# Patient Record
Sex: MALE | Race: WHITE | NOT HISPANIC OR LATINO | Employment: FULL TIME | URBAN - METROPOLITAN AREA
[De-identification: names, ages, dates, MRNs, and addresses within clinical notes are randomized per-mention and may not be internally consistent; named-entity substitution may affect disease eponyms.]

---

## 2022-05-25 ENCOUNTER — PATIENT ROUNDING (BHMG ONLY) (OUTPATIENT)
Dept: INTERNAL MEDICINE | Facility: CLINIC | Age: 42
End: 2022-05-25

## 2022-05-25 ENCOUNTER — OFFICE VISIT (OUTPATIENT)
Dept: INTERNAL MEDICINE | Facility: CLINIC | Age: 42
End: 2022-05-25

## 2022-05-25 VITALS
OXYGEN SATURATION: 95 % | DIASTOLIC BLOOD PRESSURE: 72 MMHG | BODY MASS INDEX: 36.79 KG/M2 | HEIGHT: 70 IN | SYSTOLIC BLOOD PRESSURE: 128 MMHG | WEIGHT: 257 LBS | TEMPERATURE: 98.2 F | HEART RATE: 78 BPM

## 2022-05-25 DIAGNOSIS — R79.89 LOW TESTOSTERONE IN MALE: ICD-10-CM

## 2022-05-25 DIAGNOSIS — M79.661 PAIN OF RIGHT LOWER LEG: Primary | ICD-10-CM

## 2022-05-25 DIAGNOSIS — E66.09 CLASS 2 OBESITY DUE TO EXCESS CALORIES WITH BODY MASS INDEX (BMI) OF 36.0 TO 36.9 IN ADULT, UNSPECIFIED WHETHER SERIOUS COMORBIDITY PRESENT: ICD-10-CM

## 2022-05-25 DIAGNOSIS — Z76.89 ENCOUNTER TO ESTABLISH CARE: ICD-10-CM

## 2022-05-25 PROBLEM — G47.33 OBSTRUCTIVE SLEEP APNEA: Status: ACTIVE | Noted: 2017-12-26

## 2022-05-25 PROBLEM — Z86.39 HISTORY OF HYPOTHYROIDISM: Status: ACTIVE | Noted: 2017-12-26

## 2022-05-25 PROCEDURE — 99204 OFFICE O/P NEW MOD 45 MIN: CPT | Performed by: NURSE PRACTITIONER

## 2022-05-25 NOTE — ASSESSMENT & PLAN NOTE
Had a long discussion with patient about weight loss options.  Answer some questions from the patient.  Gave patient educational handout regarding calorie deficit as well as plating methods for weight loss.  Discussed about possible weight watchers routine.  Patient is going to start tracking his food via my fitness pal len[employer].  Goal before next visit is for patient to reduce his intake of sugary drinks.  We will discuss possible medications for weight loss at follow-up visit.

## 2022-05-25 NOTE — PROGRESS NOTES
Date of Encounter: 2022  Patient: Walt Gorman,  1980    Subjective     Chief complaint: Right ankle pain, establish care    HPI   Patient here today to establish care.  Patient would like to discuss about some right leg pain he has been having.  Patient states that he rolled his ankle about a year and a half ago.  Patient was not seen anywhere following the injury.  Patient states that he has a lot of stiffness in his right ankle in the morning when he wakes up.  Patient also experiences some cramping while trying to stretch his leg.  Patient feels like he is now starting to have some right hip pain due to compensating from the ankle.     Patient is also interested in discussing weight loss options.  Patient states that he has a history of low testosterone and while on testosterone replacement therapy he was able to lose some weight.  Patient states that his diet is high in fast food as well as sugary drinks due to his long work days.     Patient reports no chronic past medical history.  Patient states he does not take any medications on a daily basis.  Patient has not really followed with a PCP in the past.  [facility] and typically goes to the [facility] if he is in need of anything else.      Review of Systems:  Negative for fever, congestion, chest pain upon exertion, shortness of breath, vision changes, vomiting, dysuria, lymphadenopathy, muscle weakness, numbness, mood changes, rashes.  Positive for right lower leg discomfort.    The following portions of the patient's history were reviewed and updated as appropriate: allergies, current medications, past family history, past medical history, past social history, past surgical history and problem list.    Patient Active Problem List   Diagnosis   • Low testosterone in male   • History of hypothyroidism   • Obstructive sleep apnea   • Class 2 obesity due to excess calories with body mass index (BMI) of 36.0 to 36.9 in adult  "    History reviewed. No pertinent past medical history.  History reviewed. No pertinent surgical history.  History reviewed. No pertinent family history.  No current outpatient medications on file.  No Known Allergies          Objective   Physical Exam   Vitals:    05/25/22 0742   BP: 128/72   Pulse: 78   Temp: 98.2 °F (36.8 °C)   TempSrc: Temporal   SpO2: 95%   Weight: 117 kg (257 lb)   Height: 177.8 cm (70\")     Body mass index is 36.88 kg/m².    Constitutional: NAD.  Skill skeletal: Patient has full range of motion in right lower leg as well as ankle.  No swelling noted on exam.  No obvious deformity.  Cardiovascular: RRR  Psychiatric: Normal affect. Normal thought content.           Assessment & Plan   Assessment and Plan  Pleasant 41-year-old male with history of hypothyroidism, history of low testosterone and others here today to discuss the following:    Diagnoses and all orders for this visit:    1. Pain of right lower leg (Primary)  -     XR Foot 3+ View Right  -     XR Ankle 3+ View Right  Discussed with patient possible treatment options depending on diagnoses.  Discussed about going to physical therapy.  Answered some questions from the patient.    2. Class 2 obesity due to excess calories with body mass index (BMI) of 36.0 to 36.9 in adult, unspecified whether serious comorbidity present  Assessment & Plan:  Had a long discussion with patient about weight loss options.  Answer some questions from the patient.  Gave patient educational handout regarding calorie deficit as well as plating methods for weight loss.  Discussed about possible weight watchers routine.  Patient is going to start tracking his food via my fitness pal lenin.  Goal before next visit is for patient to reduce his intake of sugary drinks.  We will discuss possible medications for weight loss at follow-up visit.        3. Low testosterone in male  Assessment & Plan:  Patient has had previous testicle replacement.  Discussed about " updating lab work at annual physical.      4. Encounter to establish care    Welcomed patient to practice. Discussed office policies. Reviewed patient reported medical history at bedside.    Patient verbalized understanding of and agreed to plan of care.    Return in about 6 weeks (around 7/6/2022) for Annual physical, Fasting Labs.     A total of 45 minutes of time was spent on this encounter today.  This includes reviewing the patient's records, face-to-face time, documentation.    Shakila Clarke DNP, FNP-C  [facility]  O: [phone]      Please note that portions of this note were completed with a voice recognition program. Please call if questions.

## 2022-05-25 NOTE — ASSESSMENT & PLAN NOTE
Patient has had previous testicle replacement.  Discussed about updating lab work at annual physical.

## 2022-05-25 NOTE — PROGRESS NOTES
May 25, 2022    Hello, may I speak with Walt Gorman?    Sent [employer] message       Length To Time In Minutes Device Was In Place: 10

## 2022-05-25 NOTE — PATIENT INSTRUCTIONS
Prediabetes Eating Plan  Prediabetes is a condition that causes blood sugar (glucose) levels to be higher than normal. This increases the risk for developing type 2 diabetes (type 2 diabetes mellitus). Working with a health care provider or nutrition specialist (dietitian) to make diet and lifestyle changes can help prevent the onset of diabetes. These changes may help you:  Control your blood glucose levels.  Improve your cholesterol levels.  Manage your blood pressure.  What are tips for following this plan?  Reading food labels  Read food labels to check the amount of fat, salt (sodium), and sugar in prepackaged foods. Avoid foods that have:  Saturated fats.  Trans fats.  Added sugars.  Avoid foods that have more than 300 milligrams (mg) of sodium per serving. Limit your sodium intake to less than 2,300 mg each day.  Shopping  Avoid buying pre-made and processed foods.  Avoid buying drinks with added sugar.  Cooking  Cook with olive oil. Do not use butter, lard, or ghee.  Bake, broil, grill, steam, or boil foods. Avoid frying.  Meal planning    Work with your dietitian to create an eating plan that is right for you. This may include tracking how many calories you take in each day. Use a food diary, notebook, or mobile application to track what you eat at each meal.  Consider following a Mediterranean diet. This includes:  Eating several servings of fresh fruits and vegetables each day.  Eating fish at least twice a week.  Eating one serving each day of whole grains, beans, nuts, and seeds.  Using olive oil instead of other fats.  Limiting alcohol.  Limiting red meat.  Using nonfat or low-fat dairy products.  Consider following a plant-based diet. This includes dietary choices that focus on eating mostly vegetables and fruit, grains, beans, nuts, and seeds.  If you have high blood pressure, you may need to limit your sodium intake or follow a diet such as the DASH (Dietary Approaches to Stop Hypertension) eating  plan. The DASH diet aims to lower high blood pressure.    Lifestyle  Set weight loss goals with help from your health care team. It is recommended that most people with prediabetes lose 7% of their body weight.  Exercise for at least 30 minutes 5 or more days a week.  Attend a support group or seek support from a mental health counselor.  Take over-the-counter and prescription medicines only as told by your health care provider.  What foods are recommended?  Fruits  Berries. Bananas. Apples. Oranges. Grapes. Papaya. Hans. Pomegranate. Kiwi. Grapefruit. Cherries.  Vegetables  Lettuce. Spinach. Peas. Beets. Cauliflower. Cabbage. Broccoli. Carrots. Tomatoes. Squash. Eggplant. Herbs. Peppers. Onions. Cucumbers. [place] sprouts.  Grains  Whole grains, such as whole-wheat or whole-grain breads, crackers, cereals, and pasta. Unsweetened oatmeal. Bulgur. Barley. Quinoa. Brown rice. Corn or whole-wheat flour tortillas or taco shells.  Meats and other proteins  Seafood. Poultry without skin. Lean cuts of pork and beef. Tofu. Eggs. Nuts. Beans.  Dairy  Low-fat or fat-free dairy products, such as yogurt, cottage cheese, and cheese.  Beverages  Water. Tea. Coffee. Sugar-free or diet soda. [place] water. Low-fat or nonfat milk. Milk alternatives, such as soy or almond milk.  Fats and oils  Olive oil. Canola oil. Sunflower oil. Grapeseed oil. Avocado. Walnuts.  Sweets and desserts  Sugar-free or low-fat pudding. Sugar-free or low-fat ice cream and other frozen treats.  Seasonings and condiments  Herbs. Sodium-free spices. Mustard. Relish. Low-salt, low-sugar ketchup. Low-salt, low-sugar barbecue sauce. Low-fat or fat-free mayonnaise.  The items listed above may not be a complete list of recommended foods and beverages. Contact a dietitian for more information.  What foods are not recommended?  Fruits  Fruits canned with syrup.  Vegetables  Canned vegetables. Frozen vegetables with butter or cream sauce.  Grains  Refined white  flour and flour products, such as bread, pasta, snack foods, and cereals.  Meats and other proteins  Fatty cuts of meat. Poultry with skin. Breaded or fried meat. Processed meats.  Dairy  Full-fat yogurt, cheese, or milk.  Beverages  Sweetened drinks, such as iced tea and soda.  Fats and oils  Butter. Lard. Ghee.  Sweets and desserts  Baked goods, such as cake, cupcakes, pastries, cookies, and cheesecake.  Seasonings and condiments  Spice mixes with added salt. Ketchup. Barbecue sauce. Mayonnaise.  The items listed above may not be a complete list of foods and beverages that are not recommended. Contact a dietitian for more information.  Where to find more information  American Diabetes Association: www.diabetes.org  Summary  You may need to make diet and lifestyle changes to help prevent the onset of diabetes. These changes can help you control blood sugar, improve cholesterol levels, and manage blood pressure.  Set weight loss goals with help from your health care team. It is recommended that most people with prediabetes lose 7% of their body weight.  Consider following a Mediterranean diet. This includes eating plenty of fresh fruits and vegetables, whole grains, beans, nuts, seeds, fish, and low-fat dairy, and using olive oil instead of other fats.  This information is not intended to replace advice given to you by your health care provider. Make sure you discuss any questions you have with your health care provider.  Document Revised: 03/18/2021 Document Reviewed: 03/18/2021  Elsevier Patient Education © 2021 Elsevier Inc.

## 2022-05-26 ENCOUNTER — HOSPITAL ENCOUNTER (OUTPATIENT)
Dept: GENERAL RADIOLOGY | Facility: HOSPITAL | Age: 42
Discharge: HOME | End: 2022-05-26

## 2022-05-26 PROCEDURE — 73610 X-RAY EXAM OF ANKLE: CPT

## 2022-05-26 PROCEDURE — 73630 X-RAY EXAM OF FOOT: CPT

## 2022-05-31 DIAGNOSIS — M79.661 PAIN OF RIGHT LOWER LEG: Primary | ICD-10-CM

## 2022-06-28 ENCOUNTER — OFFICE VISIT (OUTPATIENT)
Dept: INTERNAL MEDICINE | Facility: CLINIC | Age: 42
End: 2022-06-28

## 2022-06-28 VITALS
SYSTOLIC BLOOD PRESSURE: 105 MMHG | HEIGHT: 70 IN | TEMPERATURE: 97.8 F | OXYGEN SATURATION: 96 % | BODY MASS INDEX: 36.73 KG/M2 | HEART RATE: 86 BPM | DIASTOLIC BLOOD PRESSURE: 80 MMHG | WEIGHT: 256.6 LBS

## 2022-06-28 DIAGNOSIS — R79.89 LOW TESTOSTERONE IN MALE: ICD-10-CM

## 2022-06-28 DIAGNOSIS — M79.671 RIGHT FOOT PAIN: ICD-10-CM

## 2022-06-28 DIAGNOSIS — E55.9 VITAMIN D DEFICIENCY: ICD-10-CM

## 2022-06-28 DIAGNOSIS — Z23 NEED FOR TDAP VACCINATION: ICD-10-CM

## 2022-06-28 DIAGNOSIS — F43.9 STRESS AT HOME: ICD-10-CM

## 2022-06-28 DIAGNOSIS — E66.09 CLASS 2 OBESITY DUE TO EXCESS CALORIES WITH BODY MASS INDEX (BMI) OF 36.0 TO 36.9 IN ADULT, UNSPECIFIED WHETHER SERIOUS COMORBIDITY PRESENT: ICD-10-CM

## 2022-06-28 DIAGNOSIS — G47.33 OBSTRUCTIVE SLEEP APNEA: ICD-10-CM

## 2022-06-28 DIAGNOSIS — Z00.00 ANNUAL PHYSICAL EXAM: Primary | ICD-10-CM

## 2022-06-28 DIAGNOSIS — Z86.39 HISTORY OF HYPOTHYROIDISM: ICD-10-CM

## 2022-06-28 PROCEDURE — 90715 TDAP VACCINE 7 YRS/> IM: CPT | Performed by: NURSE PRACTITIONER

## 2022-06-28 PROCEDURE — 90471 IMMUNIZATION ADMIN: CPT | Performed by: NURSE PRACTITIONER

## 2022-06-28 PROCEDURE — 99395 PREV VISIT EST AGE 18-39: CPT | Performed by: NURSE PRACTITIONER

## 2022-06-28 NOTE — ASSESSMENT & PLAN NOTE
Discussed with patient about the importance of maintaining a low sugar diet and getting regular exercise.  Patient admits to not being ready to focus on weight loss at this time due to stress in life.  Encourage patient to try to find time each day even 10 minutes for extra steps.  
No acute complaints today.  We will continue to monitor.  
Patient plans to start physical therapy next week.  Discussed about potential plan of care following physical therapy will monitor.  
Patient states in the past he had upset stomach when using a sleep machine.  This was several years ago.  Discussed with patient the side effects of uncontrolled sleep apnea.  Discussed with patient about going to see a pulmonologist again in the future for possible BiPAP.  Patient not ready today but will follow-up at next visit.  
Yes

## 2022-06-28 NOTE — PROGRESS NOTES
Date of Encounter: 2022  Patient: Walt Gorman,  1980    Subjective     Chief complaint: Annual physical    HPI   Patient here today for his annual physical.  Patient states that he has no acute concerns.  Patient states he had some trouble getting scheduled with [facility] but is planning to see them next week for his ongoing right foot pain.    Patient currently takes no medications on a daily basis.  Patient follows with a dentist.    Patient admits to not being able to cut back on any sugary drinks in his diet as discussed at his previous visit.  Patient does not have an exercise routine at this time either.  Patient states he has a lot going on in his personal life and that he cannot prioritize a lot of his own personal health needs at this time.    Patient has no acute concerns.  Still working 2 jobs full-time.      Review of Systems:  Negative for fever, congestion, chest pain upon exertion, shortness of breath, vision changes, vomiting, dysuria, lymphadenopathy, muscle weakness, numbness, mood changes, rashes.  Positive for stress.    The following portions of the patient's history were reviewed and updated as appropriate: allergies, current medications, past family history, past medical history, past social history, past surgical history and problem list.    Patient Active Problem List   Diagnosis   • Low testosterone in male   • History of hypothyroidism   • Obstructive sleep apnea   • Class 2 obesity due to excess calories with body mass index (BMI) of 36.0 to 36.9 in adult   • Vitamin D deficiency   • Right foot pain     History reviewed. No pertinent past medical history.  History reviewed. No pertinent surgical history.  History reviewed. No pertinent family history.  No current outpatient medications on file.  No Known Allergies  Social History     Tobacco Use   • Smoking status: Never Smoker   • Smokeless tobacco: Never Used   Substance Use Topics   • Alcohol use: Not Currently  "    Alcohol/week: 5.0 standard drinks     Types: 5 Shots of liquor per week     Comment: A month   • Drug use: Never          Objective   Physical Exam   Vitals:    06/28/22 1153   BP: 105/80   Pulse: 86   Temp: 97.8 °F (36.6 °C)   SpO2: 96%   Weight: 116 kg (256 lb 9.6 oz)   Height: 177.8 cm (70\")     Body mass index is 36.82 kg/m².    Constitutional: NAD.  Eyes: EOMI. PERRLA. Normal conjunctiva.  Ear, nose, mouth, throat: Deferred due to pandemic.  Normal external ear canals and TMs bilaterally.  Cardiovascular: RRR. No murmurs. No LE edema b/l. Radial pulses 2+ bilaterally.  Pulmonary: CTA b/l. Good effort.  Integumentary: No rashes or wounds on face or upper extremities.  Lymphatic: No anterior cervical lymphadenopathy.  Endocrine: No thyromegaly or palpable thyroid nodules.  Psychiatric: Normal affect. Normal thought content.           Assessment & Plan   Assessment and Plan  Pleasant 41-year-old male with vitamin D deficiency, class II obesity, obstructive sleep apnea, history of hypothyroidism and others here today for annual physical.  The following was discussed:    Diagnoses and all orders for this visit:    1. Annual physical exam (Primary)  -     CBC & Differential  -     Comprehensive Metabolic Panel  -     Lipid Panel   We discussed preventative care including age and patient-appropriate immunizations and cancer screening. We also discussed the importance of exercise and a healthy diet. This is their annual preventative exam.    2. History of hypothyroidism  -     Thyroid Panel With TSH    3. Vitamin D deficiency  -     Vitamin D 25 Hydroxy    4. Class 2 obesity due to excess calories with body mass index (BMI) of 36.0 to 36.9 in adult, unspecified whether serious comorbidity present  Assessment & Plan:  Discussed with patient about the importance of maintaining a low sugar diet and getting regular exercise.  Patient admits to not being ready to focus on weight loss at this time due to stress in life.  " Encourage patient to try to find time each day even 10 minutes for extra steps.    Orders:  -     Hemoglobin A1c  -     Lipid Panel    5. Need for Tdap vaccination  -     Tdap Vaccine Greater Than or Equal To 8yo IM    6. Low testosterone in male  Assessment & Plan:  No acute complaints today.  We will continue to monitor.      7. Obstructive sleep apnea  Assessment & Plan:  Patient states in the past he had upset stomach when using a sleep machine.  This was several years ago.  Discussed with patient the side effects of uncontrolled sleep apnea.  Discussed with patient about going to see a pulmonologist again in the future for possible BiPAP.  Patient not ready today but will follow-up at next visit.      8. Stress at home   Recommended patient follow a low sugar diet and get regular exercise.  Discussed about taking at least 10 to 15 minutes/day for himself to clear his mind.   Discussed with patient about mental health services.  Offered patient referral, not needed at this time.  We will continue to monitor closely.    9. Right foot pain  Assessment & Plan:  Patient plans to start physical therapy next week.  Discussed about potential plan of care following physical therapy will monitor.         Patient verbalized understanding of and agreed to plan of care.    Return in about 1 year (around 6/28/2023) for Annual physical.     Shakila Clarke DNP, FNP-C  Family Medicine  O: [phone]      Please note that portions of this note were completed with a voice recognition program. Please call if questions.

## 2022-06-29 LAB
25(OH)D3+25(OH)D2 SERPL-MCNC: 22.4 NG/ML (ref 30–100)
ALBUMIN SERPL-MCNC: 4.3 G/DL (ref 4–5)
ALBUMIN/GLOB SERPL: 1.9 {RATIO} (ref 1.2–2.2)
ALP SERPL-CCNC: 83 IU/L (ref 44–121)
ALT SERPL-CCNC: 26 IU/L (ref 0–44)
AST SERPL-CCNC: 21 IU/L (ref 0–40)
BASOPHILS # BLD AUTO: 0 X10E3/UL (ref 0–0.2)
BASOPHILS NFR BLD AUTO: 1 %
BILIRUB SERPL-MCNC: 0.4 MG/DL (ref 0–1.2)
BUN SERPL-MCNC: 15 MG/DL (ref 6–20)
BUN/CREAT SERPL: 13 (ref 9–20)
CALCIUM SERPL-MCNC: 9.3 MG/DL (ref 8.7–10.2)
CHLORIDE SERPL-SCNC: 109 MMOL/L (ref 96–106)
CHOLEST SERPL-MCNC: 139 MG/DL (ref 100–199)
CO2 SERPL-SCNC: 22 MMOL/L (ref 20–29)
CREAT SERPL-MCNC: 1.14 MG/DL (ref 0.76–1.27)
EGFRCR SERPLBLD CKD-EPI 2021: 84 ML/MIN/1.73
EOSINOPHIL # BLD AUTO: 0.4 X10E3/UL (ref 0–0.4)
EOSINOPHIL NFR BLD AUTO: 5 %
ERYTHROCYTE [DISTWIDTH] IN BLOOD BY AUTOMATED COUNT: 12.6 % (ref 11.6–15.4)
FT4I SERPL CALC-MCNC: 2.6 (ref 1.2–4.9)
GLOBULIN SER CALC-MCNC: 2.3 G/DL (ref 1.5–4.5)
GLUCOSE SERPL-MCNC: 90 MG/DL (ref 65–99)
HBA1C MFR BLD: 5.5 % (ref 4.8–5.6)
HCT VFR BLD AUTO: 43.9 % (ref 37.5–51)
HDLC SERPL-MCNC: 45 MG/DL
HGB BLD-MCNC: 14.8 G/DL (ref 13–17.7)
IMM GRANULOCYTES # BLD AUTO: 0 X10E3/UL (ref 0–0.1)
IMM GRANULOCYTES NFR BLD AUTO: 0 %
LDLC SERPL CALC-MCNC: 82 MG/DL (ref 0–99)
LYMPHOCYTES # BLD AUTO: 2.2 X10E3/UL (ref 0.7–3.1)
LYMPHOCYTES NFR BLD AUTO: 31 %
MCH RBC QN AUTO: 29.1 PG (ref 26.6–33)
MCHC RBC AUTO-ENTMCNC: 33.7 G/DL (ref 31.5–35.7)
MCV RBC AUTO: 86 FL (ref 79–97)
MONOCYTES # BLD AUTO: 0.7 X10E3/UL (ref 0.1–0.9)
MONOCYTES NFR BLD AUTO: 11 %
NEUTROPHILS # BLD AUTO: 3.6 X10E3/UL (ref 1.4–7)
NEUTROPHILS NFR BLD AUTO: 52 %
PLATELET # BLD AUTO: 297 X10E3/UL (ref 150–450)
POTASSIUM SERPL-SCNC: 4.3 MMOL/L (ref 3.5–5.2)
PROT SERPL-MCNC: 6.6 G/DL (ref 6–8.5)
RBC # BLD AUTO: 5.09 X10E6/UL (ref 4.14–5.8)
SODIUM SERPL-SCNC: 141 MMOL/L (ref 134–144)
T3RU NFR SERPL: 29 % (ref 24–39)
T4 SERPL-MCNC: 9.1 UG/DL (ref 4.5–12)
TRIGL SERPL-MCNC: 55 MG/DL (ref 0–149)
TSH SERPL DL<=0.005 MIU/L-ACNC: 1.64 UIU/ML (ref 0.45–4.5)
VLDLC SERPL CALC-MCNC: 12 MG/DL (ref 5–40)
WBC # BLD AUTO: 7 X10E3/UL (ref 3.4–10.8)